# Patient Record
Sex: FEMALE | Race: WHITE | URBAN - METROPOLITAN AREA
[De-identification: names, ages, dates, MRNs, and addresses within clinical notes are randomized per-mention and may not be internally consistent; named-entity substitution may affect disease eponyms.]

---

## 2020-01-13 ENCOUNTER — APPOINTMENT (RX ONLY)
Dept: URBAN - METROPOLITAN AREA CLINIC 150 | Facility: CLINIC | Age: 70
Setting detail: DERMATOLOGY
End: 2020-01-13

## 2020-01-13 DIAGNOSIS — M10.0 IDIOPATHIC GOUT: ICD-10-CM

## 2020-01-13 DIAGNOSIS — L81.4 OTHER MELANIN HYPERPIGMENTATION: ICD-10-CM

## 2020-01-13 DIAGNOSIS — B35.1 TINEA UNGUIUM: ICD-10-CM

## 2020-01-13 DIAGNOSIS — L82.1 OTHER SEBORRHEIC KERATOSIS: ICD-10-CM

## 2020-01-13 DIAGNOSIS — Z71.89 OTHER SPECIFIED COUNSELING: ICD-10-CM

## 2020-01-13 DIAGNOSIS — M72.0 PALMAR FASCIAL FIBROMATOSIS [DUPUYTREN]: ICD-10-CM

## 2020-01-13 DIAGNOSIS — L40.59 OTHER PSORIATIC ARTHROPATHY: ICD-10-CM

## 2020-01-13 DIAGNOSIS — L57.0 ACTINIC KERATOSIS: ICD-10-CM

## 2020-01-13 DIAGNOSIS — M06.9 RHEUMATOID ARTHRITIS, UNSPECIFIED: ICD-10-CM

## 2020-01-13 PROBLEM — M10.00 IDIOPATHIC GOUT, UNSPECIFIED SITE: Status: ACTIVE | Noted: 2020-01-13

## 2020-01-13 PROCEDURE — ? PRESCRIPTION

## 2020-01-13 PROCEDURE — ? FULL BODY SKIN EXAM

## 2020-01-13 PROCEDURE — ? DEFER

## 2020-01-13 PROCEDURE — ? LIQUID NITROGEN

## 2020-01-13 PROCEDURE — ? COUNSELING

## 2020-01-13 PROCEDURE — 99202 OFFICE O/P NEW SF 15 MIN: CPT | Mod: 25

## 2020-01-13 PROCEDURE — 17000 DESTRUCT PREMALG LESION: CPT

## 2020-01-13 RX ORDER — FLUOROURACIL 5 MG/G
CREAM TOPICAL
Qty: 1 | Refills: 0 | Status: ERX | COMMUNITY
Start: 2020-01-13

## 2020-01-13 RX ADMIN — FLUOROURACIL: 5 CREAM TOPICAL at 00:00

## 2020-01-13 ASSESSMENT — LOCATION ZONE DERM
LOCATION ZONE: SCALP
LOCATION ZONE: TOENAIL
LOCATION ZONE: TRUNK
LOCATION ZONE: NECK
LOCATION ZONE: ARM
LOCATION ZONE: HAND
LOCATION ZONE: TOE
LOCATION ZONE: LEG
LOCATION ZONE: FACE

## 2020-01-13 ASSESSMENT — LOCATION SIMPLE DESCRIPTION DERM
LOCATION SIMPLE: LEFT FOREARM
LOCATION SIMPLE: LEFT FOREHEAD
LOCATION SIMPLE: LEFT CHEEK
LOCATION SIMPLE: LEFT HAND
LOCATION SIMPLE: RIGHT SHOULDER
LOCATION SIMPLE: UPPER BACK
LOCATION SIMPLE: RIGHT CHEEK
LOCATION SIMPLE: LEFT CALF
LOCATION SIMPLE: POSTERIOR SCALP
LOCATION SIMPLE: LEFT UPPER BACK
LOCATION SIMPLE: LEFT PRETIBIAL REGION
LOCATION SIMPLE: RIGHT CALF
LOCATION SIMPLE: RIGHT TEMPLE
LOCATION SIMPLE: RIGHT HAND
LOCATION SIMPLE: LEFT GREAT TOE
LOCATION SIMPLE: LEFT LOWER BACK
LOCATION SIMPLE: ABDOMEN
LOCATION SIMPLE: RIGHT FOREARM
LOCATION SIMPLE: RIGHT GREAT TOE
LOCATION SIMPLE: LEFT ANTERIOR NECK
LOCATION SIMPLE: RIGHT PRETIBIAL REGION
LOCATION SIMPLE: LEFT ANKLE
LOCATION SIMPLE: LEFT SHOULDER
LOCATION SIMPLE: CHEST
LOCATION SIMPLE: RIGHT ANTERIOR NECK

## 2020-01-13 ASSESSMENT — LOCATION DETAILED DESCRIPTION DERM
LOCATION DETAILED: LEFT SUPERIOR LATERAL MALAR CHEEK
LOCATION DETAILED: MIDDLE STERNUM
LOCATION DETAILED: EPIGASTRIC SKIN
LOCATION DETAILED: LEFT PROXIMAL CALF
LOCATION DETAILED: RIGHT PROXIMAL CALF
LOCATION DETAILED: RIGHT DORSAL GREAT TOE
LOCATION DETAILED: LEFT GREAT TOENAIL
LOCATION DETAILED: RIGHT ULNAR PALM
LOCATION DETAILED: RIGHT VENTRAL DISTAL FOREARM
LOCATION DETAILED: RIGHT POSTERIOR SHOULDER
LOCATION DETAILED: RIGHT SUPERIOR TEMPLE
LOCATION DETAILED: LEFT RADIAL PALM
LOCATION DETAILED: RIGHT CLAVICULAR NECK
LOCATION DETAILED: LEFT POSTERIOR SHOULDER
LOCATION DETAILED: LEFT PROXIMAL PRETIBIAL REGION
LOCATION DETAILED: RIGHT PROXIMAL DORSAL FOREARM
LOCATION DETAILED: LEFT VENTRAL PROXIMAL FOREARM
LOCATION DETAILED: MID-OCCIPITAL SCALP
LOCATION DETAILED: RIGHT SUPERIOR LATERAL MALAR CHEEK
LOCATION DETAILED: LEFT INFERIOR LATERAL FOREHEAD
LOCATION DETAILED: RIGHT INFERIOR CENTRAL MALAR CHEEK
LOCATION DETAILED: LEFT SUPERIOR PREAURICULAR CHEEK
LOCATION DETAILED: LEFT SUPERIOR MEDIAL MIDBACK
LOCATION DETAILED: LEFT INFERIOR LATERAL NECK
LOCATION DETAILED: LEFT DISTAL DORSAL FOREARM
LOCATION DETAILED: LEFT INFERIOR MEDIAL UPPER BACK
LOCATION DETAILED: LEFT POSTERIOR ANKLE
LOCATION DETAILED: SUPERIOR THORACIC SPINE
LOCATION DETAILED: LEFT CENTRAL MALAR CHEEK
LOCATION DETAILED: RIGHT DISTAL PRETIBIAL REGION

## 2020-01-13 NOTE — PROCEDURE: DEFER
Introduction Text (Please End With A Colon): The following procedure was deferred:patient to use efudex.
Detail Level: Detailed